# Patient Record
Sex: FEMALE | Race: ASIAN | HISPANIC OR LATINO | Employment: OTHER | URBAN - METROPOLITAN AREA
[De-identification: names, ages, dates, MRNs, and addresses within clinical notes are randomized per-mention and may not be internally consistent; named-entity substitution may affect disease eponyms.]

---

## 2017-12-21 DIAGNOSIS — R92.8 ABNORMAL MAMMOGRAM: Primary | ICD-10-CM

## 2018-01-02 ENCOUNTER — HOSPITAL ENCOUNTER (OUTPATIENT)
Dept: RADIOLOGY | Facility: HOSPITAL | Age: 48
Discharge: HOME | End: 2018-01-02
Attending: OBSTETRICS & GYNECOLOGY
Payer: COMMERCIAL

## 2018-01-02 DIAGNOSIS — R92.8 ABNORMAL MAMMOGRAM: ICD-10-CM

## 2018-01-02 PROCEDURE — 76642 ULTRASOUND BREAST LIMITED: CPT | Mod: TC,PO,RT

## 2018-01-02 PROCEDURE — 77065 DX MAMMO INCL CAD UNI: CPT | Mod: 26,,, | Performed by: RADIOLOGY

## 2018-01-02 PROCEDURE — 77065 DX MAMMO INCL CAD UNI: CPT | Mod: TC,PO

## 2018-01-02 PROCEDURE — 77061 BREAST TOMOSYNTHESIS UNI: CPT | Mod: TC,PO

## 2018-01-02 PROCEDURE — 76642 ULTRASOUND BREAST LIMITED: CPT | Mod: 26,RT,, | Performed by: RADIOLOGY

## 2018-01-02 PROCEDURE — 77061 BREAST TOMOSYNTHESIS UNI: CPT | Mod: 26,,, | Performed by: RADIOLOGY

## 2018-09-07 DIAGNOSIS — N13.30 HYDRONEPHROSIS: Primary | ICD-10-CM

## 2018-09-11 ENCOUNTER — HOSPITAL ENCOUNTER (OUTPATIENT)
Dept: RADIOLOGY | Facility: HOSPITAL | Age: 48
Discharge: HOME | End: 2018-09-11
Attending: FAMILY MEDICINE
Payer: COMMERCIAL

## 2018-09-11 DIAGNOSIS — N13.30 HYDRONEPHROSIS: ICD-10-CM

## 2018-09-11 PROCEDURE — 74176 CT ABD & PELVIS W/O CONTRAST: CPT | Mod: TC

## 2018-09-11 PROCEDURE — 25500020 PHARM REV CODE 255: Performed by: FAMILY MEDICINE

## 2018-09-11 PROCEDURE — 74176 CT ABD & PELVIS W/O CONTRAST: CPT | Mod: 26,,, | Performed by: RADIOLOGY

## 2018-09-11 RX ADMIN — IOHEXOL 30 ML: 350 INJECTION, SOLUTION INTRAVENOUS at 01:09

## 2018-10-23 ENCOUNTER — OFFICE VISIT (OUTPATIENT)
Dept: UROLOGY | Facility: CLINIC | Age: 48
End: 2018-10-23
Payer: COMMERCIAL

## 2018-10-23 VITALS
BODY MASS INDEX: 21.39 KG/M2 | HEIGHT: 61 IN | WEIGHT: 113.31 LBS | DIASTOLIC BLOOD PRESSURE: 74 MMHG | SYSTOLIC BLOOD PRESSURE: 101 MMHG | HEART RATE: 65 BPM

## 2018-10-23 DIAGNOSIS — R10.12 LUQ ABDOMINAL PAIN: Primary | ICD-10-CM

## 2018-10-23 PROCEDURE — 3008F BODY MASS INDEX DOCD: CPT | Mod: CPTII,S$GLB,, | Performed by: UROLOGY

## 2018-10-23 PROCEDURE — 99999 PR PBB SHADOW E&M-EST. PATIENT-LVL III: CPT | Mod: PBBFAC,,, | Performed by: UROLOGY

## 2018-10-23 PROCEDURE — 99204 OFFICE O/P NEW MOD 45 MIN: CPT | Mod: S$GLB,,, | Performed by: UROLOGY

## 2018-10-23 NOTE — PROGRESS NOTES
Subjective:       Patient ID: Carrington Mckinley is a 48 y.o. female.    Chief Complaint: hydronephrosis    HPI    Carrington Mckinley is a 48 y.o. female here for discussion and evaluation of hydronephrosis. Abdomen CT on 18 showed mild asymmetric fullness to the right collecting system and no obstructing stone identified. CT was ordered due to left-sided flank pain. Notes no pain at this time and it has been a long time since she experienced gross hematuria.     History reviewed. No pertinent past medical history.    Past Surgical History:   Procedure Laterality Date    BREAST CYST EXCISION Left     age 8     SECTION         Family History   Problem Relation Age of Onset    Breast cancer Sister        Social History     Socioeconomic History    Marital status:      Spouse name: Not on file    Number of children: Not on file    Years of education: Not on file    Highest education level: Not on file   Social Needs    Financial resource strain: Not on file    Food insecurity - worry: Not on file    Food insecurity - inability: Not on file    Transportation needs - medical: Not on file    Transportation needs - non-medical: Not on file   Occupational History    Not on file   Tobacco Use    Smoking status: Never Smoker   Substance and Sexual Activity    Alcohol use: No    Drug use: No    Sexual activity: Yes     Partners: Male   Other Topics Concern    Not on file   Social History Narrative    Not on file       Allergies:  Patient has no known allergies.    Medications:    Current Outpatient Medications:     iron bis-gly/FA/C/B12/Ca/succ (IRON-150 ORAL), Take by mouth., Disp: , Rfl:     Review of Systems   Constitutional: Negative for activity change, appetite change, chills, diaphoresis, fatigue, fever and unexpected weight change.   HENT: Negative for congestion, dental problem, hearing loss, mouth sores, postnasal drip, rhinorrhea, sinus pressure and trouble swallowing.    Eyes: Negative  for pain, discharge and itching.   Respiratory: Negative for apnea, cough, choking, chest tightness, shortness of breath and wheezing.    Cardiovascular: Negative for chest pain, palpitations and leg swelling.   Gastrointestinal: Negative for abdominal distention, abdominal pain, anal bleeding, blood in stool, constipation, diarrhea, nausea, rectal pain and vomiting.   Endocrine: Negative for polydipsia and polyuria.   Genitourinary: Negative for decreased urine volume, difficulty urinating, dyspareunia, dysuria, enuresis, flank pain, frequency, genital sores, hematuria, menstrual problem, pelvic pain and urgency.   Musculoskeletal: Negative for arthralgias, back pain and myalgias.   Skin: Negative for color change, rash and wound.   Neurological: Negative for dizziness, syncope, speech difficulty, light-headedness and headaches.   Hematological: Negative for adenopathy. Does not bruise/bleed easily.   Psychiatric/Behavioral: Negative for behavioral problems, confusion and sleep disturbance.       Objective:      Physical Exam   Constitutional: She appears well-developed.   HENT:   Head: Normocephalic.   Neck: Neck supple.   Cardiovascular: Normal rate.    Pulmonary/Chest: Effort normal.   Abdominal: Soft.   Neurological: She is alert.   Skin: Skin is warm.     Psychiatric: She has a normal mood and affect.       Imaging:    CT Abdomen 09/11/18  Mild asymmetric fullness to the right collecting system.  No obstructing stone identified.  Further evaluation could be performed with a CT urogram, if indicated.    Assessment:       1. LUQ abdominal pain        Plan:       Carrington was seen today for hydronephrosis.    Diagnoses and all orders for this visit:    LUQ abdominal pain          Reviewed with patient abdominal CT showing no hydronephrosis.  Recommended lasix renal scan but patient would like to continue monitoring it at this time.  RTC if symptoms worsen or if she decides for lasix renal scan.    Akin PRECIADO, am  acting as a scribe on this patient encounter in the presence and under the supervision of Dr. Gutierres.    10/23/2018 7:06 AM    I, Dr. Gutierres, personally performed the services described in this documentation.   All medical record entries made by the scribe were at my direction and in my presence.   I have reviewed the chart and agree that the record is accurate and complete.   Lorne Gutierres MD.  7:22 AM 10/23/2018     Patient will call me if she has any problems and I will order a Lasix renal scan.  I do not think that she has significant hydronephrosis in her discomfort is on the left sign not the right side.  Patient will call me p.r.n.

## 2019-01-23 DIAGNOSIS — Z12.39 SCREENING BREAST EXAMINATION: Primary | ICD-10-CM

## 2019-01-29 ENCOUNTER — HOSPITAL ENCOUNTER (OUTPATIENT)
Dept: RADIOLOGY | Facility: HOSPITAL | Age: 49
Discharge: HOME | End: 2019-01-29
Attending: FAMILY MEDICINE
Payer: COMMERCIAL

## 2019-01-29 DIAGNOSIS — Z12.39 SCREENING BREAST EXAMINATION: ICD-10-CM

## 2019-01-29 PROCEDURE — 77067 SCR MAMMO BI INCL CAD: CPT | Mod: TC

## 2019-01-29 PROCEDURE — 77063 BREAST TOMOSYNTHESIS BI: CPT | Mod: 26,,, | Performed by: RADIOLOGY

## 2019-01-29 PROCEDURE — 77063 MAMMO DIGITAL SCREENING BILAT WITH TOMOSYNTHESIS_CAD: ICD-10-PCS | Mod: 26,,, | Performed by: RADIOLOGY

## 2019-01-29 PROCEDURE — 77067 MAMMO DIGITAL SCREENING BILAT WITH TOMOSYNTHESIS_CAD: ICD-10-PCS | Mod: 26,,, | Performed by: RADIOLOGY

## 2019-01-29 PROCEDURE — 77067 SCR MAMMO BI INCL CAD: CPT | Mod: 26,,, | Performed by: RADIOLOGY

## 2020-01-20 ENCOUNTER — TELEPHONE (OUTPATIENT)
Dept: OBSTETRICS AND GYNECOLOGY | Facility: CLINIC | Age: 50
End: 2020-01-20

## 2020-01-20 NOTE — TELEPHONE ENCOUNTER
----- Message from Gabriela Weaver sent at 1/20/2020 10:41 AM CST -----  Contact: pt @[phone]  Pt called in to schedule mammogram but needs orders

## 2020-01-20 NOTE — TELEPHONE ENCOUNTER
Pt notified she must be seen for annual prior to having mammo order. Pt schedule annual with Dr. Wiedemann

## 2020-02-12 DIAGNOSIS — Z12.31 VISIT FOR SCREENING MAMMOGRAM: Primary | ICD-10-CM

## 2020-02-18 ENCOUNTER — HOSPITAL ENCOUNTER (OUTPATIENT)
Dept: RADIOLOGY | Facility: HOSPITAL | Age: 50
Discharge: HOME | End: 2020-02-18
Attending: OBSTETRICS & GYNECOLOGY
Payer: COMMERCIAL

## 2020-02-18 DIAGNOSIS — Z12.31 VISIT FOR SCREENING MAMMOGRAM: ICD-10-CM

## 2020-02-18 PROCEDURE — 77063 MAMMO DIGITAL SCREENING BILAT WITH TOMOSYNTHESIS_CAD: ICD-10-PCS | Mod: 26,,, | Performed by: RADIOLOGY

## 2020-02-18 PROCEDURE — 77063 BREAST TOMOSYNTHESIS BI: CPT | Mod: 26,,, | Performed by: RADIOLOGY

## 2020-02-18 PROCEDURE — 77067 SCR MAMMO BI INCL CAD: CPT | Mod: TC

## 2020-02-18 PROCEDURE — 77067 SCR MAMMO BI INCL CAD: CPT | Mod: 26,,, | Performed by: RADIOLOGY

## 2020-02-18 PROCEDURE — 77067 MAMMO DIGITAL SCREENING BILAT WITH TOMOSYNTHESIS_CAD: ICD-10-PCS | Mod: 26,,, | Performed by: RADIOLOGY

## 2020-03-03 ENCOUNTER — HOSPITAL ENCOUNTER (OUTPATIENT)
Dept: RADIOLOGY | Facility: HOSPITAL | Age: 50
Discharge: HOME | End: 2020-03-03
Attending: OBSTETRICS & GYNECOLOGY
Payer: COMMERCIAL

## 2020-03-03 DIAGNOSIS — R10.2 PELVIC PAIN IN FEMALE: ICD-10-CM

## 2020-03-03 PROCEDURE — 76856 US EXAM PELVIC COMPLETE: CPT | Mod: 26,,, | Performed by: RADIOLOGY

## 2020-03-03 PROCEDURE — 76830 US PELVIS COMP WITH TRANSVAG NON-OB (XPD): ICD-10-PCS | Mod: 26,,, | Performed by: RADIOLOGY

## 2020-03-03 PROCEDURE — 76830 TRANSVAGINAL US NON-OB: CPT | Mod: 26,,, | Performed by: RADIOLOGY

## 2020-03-03 PROCEDURE — 76856 US PELVIS COMP WITH TRANSVAG NON-OB (XPD): ICD-10-PCS | Mod: 26,,, | Performed by: RADIOLOGY

## 2020-03-03 PROCEDURE — 76830 TRANSVAGINAL US NON-OB: CPT | Mod: TC

## 2021-02-22 DIAGNOSIS — Z12.31 SCREENING MAMMOGRAM, ENCOUNTER FOR: Primary | ICD-10-CM

## 2021-03-01 ENCOUNTER — HOSPITAL ENCOUNTER (OUTPATIENT)
Dept: RADIOLOGY | Facility: HOSPITAL | Age: 51
Discharge: HOME | End: 2021-03-01
Attending: FAMILY MEDICINE
Payer: COMMERCIAL

## 2021-03-01 VITALS — WEIGHT: 122 LBS | BODY MASS INDEX: 24.6 KG/M2 | HEIGHT: 59 IN

## 2021-03-01 DIAGNOSIS — Z12.31 SCREENING MAMMOGRAM, ENCOUNTER FOR: ICD-10-CM

## 2021-03-01 PROCEDURE — 77067 MAMMO DIGITAL SCREENING BILAT WITH TOMO: ICD-10-PCS | Mod: 26,,, | Performed by: RADIOLOGY

## 2021-03-01 PROCEDURE — 77063 MAMMO DIGITAL SCREENING BILAT WITH TOMO: ICD-10-PCS | Mod: 26,,, | Performed by: RADIOLOGY

## 2021-03-01 PROCEDURE — 77063 BREAST TOMOSYNTHESIS BI: CPT | Mod: 26,,, | Performed by: RADIOLOGY

## 2021-03-01 PROCEDURE — 77067 SCR MAMMO BI INCL CAD: CPT | Mod: TC

## 2021-03-01 PROCEDURE — 77067 SCR MAMMO BI INCL CAD: CPT | Mod: 26,,, | Performed by: RADIOLOGY

## 2022-04-12 ENCOUNTER — HOSPITAL ENCOUNTER (OUTPATIENT)
Dept: RADIOLOGY | Facility: HOSPITAL | Age: 52
Discharge: HOME | End: 2022-04-12
Attending: FAMILY MEDICINE
Payer: COMMERCIAL

## 2022-04-12 DIAGNOSIS — Z12.31 ENCOUNTER FOR SCREENING MAMMOGRAM FOR MALIGNANT NEOPLASM OF BREAST: ICD-10-CM

## 2022-04-12 PROCEDURE — 77063 BREAST TOMOSYNTHESIS BI: CPT | Mod: TC

## 2022-04-12 PROCEDURE — 77067 MAMMO DIGITAL SCREENING BILAT WITH TOMO: ICD-10-PCS | Mod: 26,,, | Performed by: RADIOLOGY

## 2022-04-12 PROCEDURE — 77067 SCR MAMMO BI INCL CAD: CPT | Mod: TC

## 2022-04-12 PROCEDURE — 77063 MAMMO DIGITAL SCREENING BILAT WITH TOMO: ICD-10-PCS | Mod: 26,,, | Performed by: RADIOLOGY

## 2022-04-12 PROCEDURE — 77067 SCR MAMMO BI INCL CAD: CPT | Mod: 26,,, | Performed by: RADIOLOGY

## 2022-04-12 PROCEDURE — 77063 BREAST TOMOSYNTHESIS BI: CPT | Mod: 26,,, | Performed by: RADIOLOGY

## 2022-06-02 ENCOUNTER — TELEPHONE (OUTPATIENT)
Dept: GASTROENTEROLOGY | Facility: CLINIC | Age: 52
End: 2022-06-02
Payer: COMMERCIAL

## 2022-06-02 NOTE — TELEPHONE ENCOUNTER
Spoke to patient, I scheduled patient for an appointment to see Dr. Pinedo on 07/14/2022 @8:30am.  Patient stated that she is having heartburn, and trouble swallowing.

## 2022-06-02 NOTE — TELEPHONE ENCOUNTER
----- Message from Nitish Roblero sent at 6/2/2022  2:11 PM CDT -----  Type:  Patient Requesting Referral    Who Called New Pt   Does the patient already have the specialty appointment scheduled?  If yes, what is the date of that appointment?:  Referral to What Specialty:gi  Reason for Referral:heart burn / trouble swallowing   Does the patient want the referral with a specific physician?:open  Is the specialist an [facility]  Patient Requesting a Response?:yes   Would the patient rather a call back or a response via [facility]? Call   Best Call Back Number:[phone]  Additional Information:     Referred by Dr Maria A Soliz

## 2022-06-16 DIAGNOSIS — R10.9 STOMACH ACHE: Primary | ICD-10-CM

## 2022-06-21 ENCOUNTER — HOSPITAL ENCOUNTER (OUTPATIENT)
Dept: RADIOLOGY | Facility: HOSPITAL | Age: 52
Discharge: HOME | End: 2022-06-21
Attending: FAMILY MEDICINE
Payer: COMMERCIAL

## 2022-06-21 DIAGNOSIS — R10.9 STOMACH ACHE: ICD-10-CM

## 2022-06-21 PROCEDURE — 74150 CT ABDOMEN WITHOUT CONTRAST: ICD-10-PCS | Mod: 26,,, | Performed by: RADIOLOGY

## 2022-06-21 PROCEDURE — 74150 CT ABDOMEN W/O CONTRAST: CPT | Mod: TC

## 2022-06-21 PROCEDURE — 74150 CT ABDOMEN W/O CONTRAST: CPT | Mod: 26,,, | Performed by: RADIOLOGY

## 2022-07-14 ENCOUNTER — OFFICE VISIT (OUTPATIENT)
Dept: GASTROENTEROLOGY | Facility: CLINIC | Age: 52
End: 2022-07-14
Payer: COMMERCIAL

## 2022-07-14 VITALS — HEIGHT: 61 IN | BODY MASS INDEX: 22.02 KG/M2 | WEIGHT: 116.63 LBS

## 2022-07-14 DIAGNOSIS — K21.9 GASTROESOPHAGEAL REFLUX DISEASE, UNSPECIFIED WHETHER ESOPHAGITIS PRESENT: Primary | ICD-10-CM

## 2022-07-14 DIAGNOSIS — R13.10 DYSPHAGIA, UNSPECIFIED TYPE: ICD-10-CM

## 2022-07-14 DIAGNOSIS — Z12.11 COLON CANCER SCREENING: ICD-10-CM

## 2022-07-14 PROCEDURE — 1159F MED LIST DOCD IN RCRD: CPT | Mod: CPTII,S$GLB,, | Performed by: INTERNAL MEDICINE

## 2022-07-14 PROCEDURE — 99204 OFFICE O/P NEW MOD 45 MIN: CPT | Mod: S$GLB,,, | Performed by: INTERNAL MEDICINE

## 2022-07-14 PROCEDURE — 99999 PR PBB SHADOW E&M-EST. PATIENT-LVL III: ICD-10-PCS | Mod: PBBFAC,,, | Performed by: INTERNAL MEDICINE

## 2022-07-14 PROCEDURE — 99999 PR PBB SHADOW E&M-EST. PATIENT-LVL III: CPT | Mod: PBBFAC,,, | Performed by: INTERNAL MEDICINE

## 2022-07-14 PROCEDURE — 3008F BODY MASS INDEX DOCD: CPT | Mod: CPTII,S$GLB,, | Performed by: INTERNAL MEDICINE

## 2022-07-14 PROCEDURE — 3008F PR BODY MASS INDEX (BMI) DOCUMENTED: ICD-10-PCS | Mod: CPTII,S$GLB,, | Performed by: INTERNAL MEDICINE

## 2022-07-14 PROCEDURE — 1159F PR MEDICATION LIST DOCUMENTED IN MEDICAL RECORD: ICD-10-PCS | Mod: CPTII,S$GLB,, | Performed by: INTERNAL MEDICINE

## 2022-07-14 PROCEDURE — 99204 PR OFFICE/OUTPT VISIT, NEW, LEVL IV, 45-59 MIN: ICD-10-PCS | Mod: S$GLB,,, | Performed by: INTERNAL MEDICINE

## 2022-07-14 RX ORDER — SUCRALFATE 1 G/1
1 TABLET ORAL 2 TIMES DAILY
COMMUNITY
Start: 2022-06-13 | End: 2023-02-07 | Stop reason: ALTCHOICE

## 2022-07-14 RX ORDER — ATORVASTATIN CALCIUM 10 MG/1
10 TABLET, FILM COATED ORAL NIGHTLY
COMMUNITY
Start: 2022-06-13

## 2022-07-14 RX ORDER — SOD SULF/POT CHLORIDE/MAG SULF 1.479 G
12 TABLET ORAL DAILY
Qty: 24 TABLET | Refills: 0 | Status: ON HOLD | OUTPATIENT
Start: 2022-07-14 | End: 2022-09-13 | Stop reason: CLARIF

## 2022-07-14 RX ORDER — PANTOPRAZOLE SODIUM 40 MG/1
40 TABLET, DELAYED RELEASE ORAL DAILY
COMMUNITY
Start: 2022-06-13 | End: 2022-12-08 | Stop reason: SDUPTHER

## 2022-07-14 NOTE — PROGRESS NOTES
Subjective:       Patient ID: Carrington Mckinley is a 51 y.o. female.    Chief Complaint: Heartburn and Gastroesophageal Reflux    Patient here today to establish care with aforementioned complaints.  Patient reports history of upset stomach which has been going on for the past 4-5 months.  She characterizes complaints as I pain when hungry I as well as a fullness in chest after eating.  She was prescribed Protonix which she is taking as instructed.  She does not feel like it is helping much.  She denies weight loss.  Denies family history of stomach or colon cancer.  She does report her father had pancreatic cancer.  She denies prior endoscopy.      CT abdomen pelvis without contrast 2026  FINDINGS:  The liver, gallbladder, pancreas, spleen, and adrenal glands are grossly unremarkable, noting limited assessment without IV contrast.  No renal stones or hydronephrosis.  The abdominal aorta tapers normally.  There is minimal scattered calcific atherosclerosis.  No periaortic lymphadenopathy.     There is prominent stool at the terminal ileum suggesting incompetent ileocecal valve.  Scattered colonic diverticulum.  No pericolonic fat stranding/inflammatory change.  No dilated loops of bowel.  No mesenteric lymphadenopathy.     Ventral wall: Unremarkable.     Bones: No marrow replacement process.     Lung bases: Clear.     Impression:     No acute findings.      History reviewed. No pertinent past medical history.    Past Surgical History:   Procedure Laterality Date    BREAST CYST EXCISION Left     age 8     SECTION         Social History  Social History     Tobacco Use    Smoking status: Never Smoker   Substance Use Topics    Alcohol use: No    Drug use: No       Family History   Problem Relation Age of Onset    Breast cancer Sister        Review of Systems   HENT: Positive for trouble swallowing.    Cardiovascular: Positive for chest pain.   Gastrointestinal: Positive for abdominal distention and  abdominal pain. Negative for blood in stool.           Objective:      Physical Exam  Constitutional:       Appearance: She is well-developed.   HENT:      Head: Normocephalic and atraumatic.   Eyes:      Conjunctiva/sclera: Conjunctivae normal.   Pulmonary:      Effort: Pulmonary effort is normal. No respiratory distress.   Musculoskeletal:      Cervical back: Normal range of motion.   Neurological:      Mental Status: She is alert and oriented to person, place, and time.   Psychiatric:         Behavior: Behavior normal.         Thought Content: Thought content normal.         Judgment: Judgment normal.           Pertinent labs and imaging studies reviewed    Assessment:       1. Gastroesophageal reflux disease, unspecified whether esophagitis present    2. Colon cancer screening    3. Dysphagia, unspecified type        Plan:       Schedule EGD/colonoscopy    (Portions of this note were dictated using voice recognition software and may contain dictation related errors in spelling/grammar/syntax not found on text review)

## 2022-07-14 NOTE — PATIENT INSTRUCTIONS
SUTAB Instructions    [facility] [address]  Clinic Office [phone]  Endoscopy Lab [phone]    You are scheduled for a Colonoscopy with   Dr. ADAM BARRY  on   09/13/2022  at [facility] in [place].    Check in at the Hospital -1st floor, Information desk. A covid test will be required 3 days before your procedure.  Call [phone] to reschedule.    An adult friend/family member must come with you to drive you home.  You cannot drive, take a taxi, Uber/Lyft or bus to leave the Endoscopy Center alone.  If you do not have someone to drive you home, your test will be cancelled.     Please follow the directions of your doctor if you take any pills that thin your blood. If you take these meds: Aggrenox, Brilinta, Effient, Eliquis, Lovenox, Plavix, Pletal, Pradaxa, Ticilid, Xarelto or Coumadin, let the doctor's office know.    DON'T: On the morning of the test do not take insulin or pills for diabetes.     DO: On the morning of the test, do take any pills for blood pressure, heart, anti-rejection and or seizures with a small sip of water. Bring any inhalers with you.    To have a good prep, you must follow these instructions - please do not use the directions from the pharmacy.    The doctor will send a prescription for the SUTAB.    The Day Before the test:    You can only drink CLEAR LIQUIDS the whole day before your test.  You can't eat any food for the whole day.    You CAN have:  Water, Coffee or decaf coffee (no milk or cream)  Tea  Soft drinks - regular and sugar free  Jell-O (green or yellow)  Apple Juice, grape juice, white cranberry juice  Gatorade, Power Aid, Crystal Light, Shai Aid  Lemonade and Limeade  Bouillon, clear soup  Snowball, popsicles  YOU CAN'T DRINK ANYTHING RED, PURPLE ORANGE OR BLUE   YOU CAN'T DRINK ALCOHOL  ONLY DRINK WHAT IS ON THE LIST      At 5 pm the night before your test:    Open the bottle of 12 tablets. Fill the provided cup with  water up to the line = 16 oz. Swallow each pill with water. Drink the rest of the water in the cup in 20 minutes.    At 6 pm:  Fill the cup with water up to the line = 16 oz. Drink the cup of water in 30 minutes.    At 7 pm:  Fill the cup with water up to the line = 16 oz. Drink the cup of water in 30 minutes.     Continue to drink water or clear liquids until you go to sleep.      The Day of the test - We will call you 2 days before your test to tell you what time to get to the hospital. We will also tell you when to do the next steps.    5 hours before you come to the hospital (this may be in the middle of the night): ___________________= time  Open the bottle of 12 tablets. Fill the cup with water up to the line = 16 oz. Swallow each pill with water. Drink the rest of the water in the cup in 20 minutes.    At 4 hours before you come to the hospital: ______________= time  Fill the cup with water up to the line = 16 oz. Drink the cup of water in 30 minutes.     At 3 hours before you come to the hospital: ______________= time    YOU CAN'T EAT OR DRINK ANYTHING ELSE ONCE YOU FINISH THE WATER AT _____________ = TIME    Leave all valuables and jewelry at home. You will be at the hospital for 2-4 hours.    Call the Endoscopy department at [phone] with any questions about your procedure.          Please give this Coupon Code to your pharmacist.    BIN: [id number]  DANIALN: [id number]  GROUP: [id number]  MEMBER ID: [id number]

## 2022-09-06 ENCOUNTER — TELEPHONE (OUTPATIENT)
Dept: GASTROENTEROLOGY | Facility: CLINIC | Age: 52
End: 2022-09-06
Payer: COMMERCIAL

## 2022-09-06 NOTE — TELEPHONE ENCOUNTER
Returned pt's call. Pt wanting to know what time procedure is scheduled. Advised hospital will call with arrival time 2-3 days prior to procedure to let her know. Pt verbalized understanding and thanked me for the call.

## 2022-09-08 ENCOUNTER — TELEPHONE (OUTPATIENT)
Dept: ENDOSCOPY | Facility: HOSPITAL | Age: 52
End: 2022-09-08
Payer: COMMERCIAL

## 2022-09-08 NOTE — TELEPHONE ENCOUNTER
Spoke with patient about arrival time @ 1210.   Covid test =  Vaccinated    Prep instructions reviewed: the day before the procedure, follow a clear liquid diet all day, then start the first 1/2 of prep at 5pm and take 2nd 1/2 of prep @.  Pt must be completely NPO when prep completed @.              Medications: Do not take Insulin or oral diabetic medications the day of the procedure.  Take as prescribed: heart, seizure and blood pressure medication in the morning with a sip of water (less than an ounce).  Take any breathing medications and bring inhalers to hospital with you Leave all valuables and jewelry at home.     Wear comfortable clothes to procedure to change into hospital gown You cannot drive for 24 hours after your procedure because you will receive sedation for your procedure to make you comfortable.  A ride must be provided at discharge.

## 2022-09-13 ENCOUNTER — NURSE TRIAGE (OUTPATIENT)
Dept: ADMINISTRATIVE | Facility: CLINIC | Age: 52
End: 2022-09-13
Payer: COMMERCIAL

## 2022-09-13 ENCOUNTER — HOSPITAL ENCOUNTER (OUTPATIENT)
Facility: HOSPITAL | Age: 52
Discharge: HOME | End: 2022-09-13
Attending: INTERNAL MEDICINE | Admitting: INTERNAL MEDICINE
Payer: COMMERCIAL

## 2022-09-13 ENCOUNTER — ANESTHESIA (OUTPATIENT)
Dept: ENDOSCOPY | Facility: HOSPITAL | Age: 52
End: 2022-09-13
Payer: COMMERCIAL

## 2022-09-13 ENCOUNTER — ANESTHESIA EVENT (OUTPATIENT)
Dept: ENDOSCOPY | Facility: HOSPITAL | Age: 52
End: 2022-09-13
Payer: COMMERCIAL

## 2022-09-13 VITALS
WEIGHT: 120 LBS | TEMPERATURE: 99 F | DIASTOLIC BLOOD PRESSURE: 55 MMHG | SYSTOLIC BLOOD PRESSURE: 109 MMHG | HEART RATE: 75 BPM | HEIGHT: 61 IN | BODY MASS INDEX: 22.66 KG/M2 | OXYGEN SATURATION: 100 % | RESPIRATION RATE: 17 BRPM

## 2022-09-13 DIAGNOSIS — K21.9 GERD (GASTROESOPHAGEAL REFLUX DISEASE): ICD-10-CM

## 2022-09-13 LAB
B-HCG UR QL: NEGATIVE
CTP QC/QA: YES

## 2022-09-13 PROCEDURE — 27201012 HC FORCEPS, HOT/COLD, DISP: Performed by: INTERNAL MEDICINE

## 2022-09-13 PROCEDURE — 45380 PR COLONOSCOPY,BIOPSY: ICD-10-PCS | Mod: 33,59,, | Performed by: INTERNAL MEDICINE

## 2022-09-13 PROCEDURE — 88305 TISSUE EXAM BY PATHOLOGIST: CPT | Mod: 26,,, | Performed by: PATHOLOGY

## 2022-09-13 PROCEDURE — 45385 PR COLONOSCOPY,REMV LESN,SNARE: ICD-10-PCS | Mod: 33,,, | Performed by: INTERNAL MEDICINE

## 2022-09-13 PROCEDURE — 25000003 PHARM REV CODE 250: Performed by: INTERNAL MEDICINE

## 2022-09-13 PROCEDURE — 27201089 HC SNARE, DISP (ANY): Performed by: INTERNAL MEDICINE

## 2022-09-13 PROCEDURE — 37000009 HC ANESTHESIA EA ADD 15 MINS: Performed by: INTERNAL MEDICINE

## 2022-09-13 PROCEDURE — 25000003 PHARM REV CODE 250: Performed by: NURSE ANESTHETIST, CERTIFIED REGISTERED

## 2022-09-13 PROCEDURE — 81025 URINE PREGNANCY TEST: CPT | Performed by: INTERNAL MEDICINE

## 2022-09-13 PROCEDURE — 45385 COLONOSCOPY W/LESION REMOVAL: CPT | Mod: 33,,, | Performed by: INTERNAL MEDICINE

## 2022-09-13 PROCEDURE — 37000008 HC ANESTHESIA 1ST 15 MINUTES: Performed by: INTERNAL MEDICINE

## 2022-09-13 PROCEDURE — 88305 TISSUE EXAM BY PATHOLOGIST: ICD-10-PCS | Mod: 26,,, | Performed by: PATHOLOGY

## 2022-09-13 PROCEDURE — 43239 EGD BIOPSY SINGLE/MULTIPLE: CPT | Mod: 51,,, | Performed by: INTERNAL MEDICINE

## 2022-09-13 PROCEDURE — 45385 COLONOSCOPY W/LESION REMOVAL: CPT | Mod: PT | Performed by: INTERNAL MEDICINE

## 2022-09-13 PROCEDURE — 63600175 PHARM REV CODE 636 W HCPCS: Performed by: NURSE ANESTHETIST, CERTIFIED REGISTERED

## 2022-09-13 PROCEDURE — 45380 COLONOSCOPY AND BIOPSY: CPT | Mod: 33,59,, | Performed by: INTERNAL MEDICINE

## 2022-09-13 PROCEDURE — 43239 EGD BIOPSY SINGLE/MULTIPLE: CPT | Performed by: INTERNAL MEDICINE

## 2022-09-13 PROCEDURE — 43239 PR EGD, FLEX, W/BIOPSY, SGL/MULTI: ICD-10-PCS | Mod: 51,,, | Performed by: INTERNAL MEDICINE

## 2022-09-13 PROCEDURE — 45380 COLONOSCOPY AND BIOPSY: CPT | Mod: PT,59 | Performed by: INTERNAL MEDICINE

## 2022-09-13 PROCEDURE — 88305 TISSUE EXAM BY PATHOLOGIST: CPT | Performed by: PATHOLOGY

## 2022-09-13 RX ORDER — LIDOCAINE HCL/PF 100 MG/5ML
SYRINGE (ML) INTRAVENOUS
Status: DISCONTINUED | OUTPATIENT
Start: 2022-09-13 | End: 2022-09-13

## 2022-09-13 RX ORDER — SODIUM CHLORIDE 9 MG/ML
INJECTION, SOLUTION INTRAVENOUS CONTINUOUS
Status: DISCONTINUED | OUTPATIENT
Start: 2022-09-13 | End: 2022-09-13 | Stop reason: HOSPADM

## 2022-09-13 RX ORDER — SODIUM CHLORIDE 0.9 % (FLUSH) 0.9 %
3 SYRINGE (ML) INJECTION
Status: DISCONTINUED | OUTPATIENT
Start: 2022-09-13 | End: 2022-09-13 | Stop reason: HOSPADM

## 2022-09-13 RX ORDER — PROPOFOL 10 MG/ML
VIAL (ML) INTRAVENOUS
Status: DISCONTINUED | OUTPATIENT
Start: 2022-09-13 | End: 2022-09-13

## 2022-09-13 RX ORDER — PROPOFOL 10 MG/ML
VIAL (ML) INTRAVENOUS CONTINUOUS PRN
Status: DISCONTINUED | OUTPATIENT
Start: 2022-09-13 | End: 2022-09-13

## 2022-09-13 RX ORDER — DEXTROMETHORPHAN/PSEUDOEPHED 2.5-7.5/.8
DROPS ORAL
Status: DISCONTINUED | OUTPATIENT
Start: 2022-09-13 | End: 2022-09-13 | Stop reason: HOSPADM

## 2022-09-13 RX ADMIN — PROPOFOL 60 MG: 10 INJECTION, EMULSION INTRAVENOUS at 12:09

## 2022-09-13 RX ADMIN — LIDOCAINE HYDROCHLORIDE 50 MG: 20 INJECTION, SOLUTION INTRAVENOUS at 12:09

## 2022-09-13 RX ADMIN — SODIUM CHLORIDE: 0.9 INJECTION, SOLUTION INTRAVENOUS at 12:09

## 2022-09-13 RX ADMIN — PROPOFOL 150 MCG/KG/MIN: 10 INJECTION, EMULSION INTRAVENOUS at 12:09

## 2022-09-13 NOTE — PLAN OF CARE
Pt procedure completed with no complications.Pt AAO x 4.V/S are stable.Discharge instructions given and explained. Pt verbalizes understanding.IV discontinued as ordered. spoke to pt about results and follow up care.

## 2022-09-13 NOTE — PROVATION PATIENT INSTRUCTIONS
Discharge Summary/Instructions after an Endoscopic Procedure  Patient Name: Carrington Mckinley  Patient MRN: [id number]  Patient YOB: 1970 Tuesday, September 13, 2022  Shilo Pinedo MD  Dear patient,  As a result of recent federal legislation (The Federal Cures Act), you may   receive lab or pathology results from your procedure in your [facility]   account before your physician is able to contact you. Your physician or   their representative will relay the results to you with their   recommendations at their soonest availability.  Thank you,  Your health is very important to us during the Covid Crisis. Following your   procedure today, you will receive a daily text for 2 weeks asking about   signs or symptoms of Covid 19.  Please respond to this text when you   receive it so we can follow up and keep you as safe as possible.   RESTRICTIONS:  During your procedure today, you received medications for sedation.  These   medications may affect your judgment, balance and coordination.  Therefore,   for 24 hours, you have the following restrictions:   - DO NOT drive a car, operate machinery, make legal/financial decisions,   sign important papers or drink alcohol.    ACTIVITY:  Today: no heavy lifting, straining or running due to procedural   sedation/anesthesia.  The following day: return to full activity including work.  DIET:  Eat and drink normally unless instructed otherwise.     TREATMENT FOR COMMON SIDE EFFECTS:  - Mild abdominal pain, nausea, belching, bloating or excessive gas:  rest,   eat lightly and use a heating pad.  - Sore Throat: treat with throat lozenges and/or gargle with warm salt   water.  - Because air was used during the procedure, expelling large amounts of air   from your rectum or belching is normal.  - If a bowel prep was taken, you may not have a bowel movement for 1-3 days.    This is normal.  SYMPTOMS TO WATCH FOR AND REPORT TO YOUR PHYSICIAN:  1. Abdominal pain or bloating,  other than gas cramps.  2. Chest pain.  3. Back pain.  4. Signs of infection such as: chills or fever occurring within 24 hours   after the procedure.  5. Rectal bleeding, which would show as bright red, maroon, or black stools.   (A tablespoon of blood from the rectum is not serious, especially if   hemorrhoids are present.)  6. Vomiting.  7. Weakness or dizziness.  GO DIRECTLY TO THE NEAREST EMERGENCY ROOM IF YOU HAVE ANY OF THE FOLLOWING:      Difficulty breathing              Chills and/or fever over 101 F   Persistent vomiting and/or vomiting blood   Severe abdominal pain   Severe chest pain   Black, tarry stools   Bleeding- more than one tablespoon   Any other symptom or condition that you feel may need urgent attention  Your doctor recommends these additional instructions:  If any biopsies were taken, your doctors clinic will contact you in 1 to 2   weeks with any results.  - Discharge patient to home.   - Resume previous diet.   - Continue present medications.   - Await pathology results.   - Perform a colonoscopy as previously scheduled.  For questions, problems or results please call your physician - Shilo Pinedo MD.  EMERGENCY PHONE NUMBER: [phone],  LAB RESULTS: [phone]  IF A COMPLICATION OR EMERGENCY SITUATION ARISES AND YOU ARE UNABLE TO REACH   YOUR PHYSICIAN - GO DIRECTLY TO THE EMERGENCY ROOM.  Shilo Pinedo MD  9/13/2022 1:04:19 PM  This report has been verified and signed electronically.  Dear patient,  As a result of recent federal legislation (The Federal Cures Act), you may   receive lab or pathology results from your procedure in your [facility]   account before your physician is able to contact you. Your physician or   their representative will relay the results to you with their   recommendations at their soonest availability.  Thank you,  PROVATION

## 2022-09-13 NOTE — H&P
Short Stay Endoscopy History and Physical    PCP - Primary Doctor No    Procedure - EGD/colonoscopy  ASA - III  Mallampati - per anesthesia  History of Anesthesia problems - no  Family history Anesthesia problems - no     HPI:  This is a 52 y.o. female here for evaluation of : Dysphagia. CRC screening    ROS:  Constitutional: No fevers, chills, No weight loss  ENT: No allergies  CV: No chest pain  Pulm: No shortness of breath  GI: see HPI  Derm: No rash    Medical History:  has no past medical history on file.    Surgical History:  has a past surgical history that includes  section and Breast cyst excision (Left).    Family History: family history includes Breast cancer in her sister.. Otherwise no colon cancer, inflammatory bowel disease, or GI malignancies.    Social History:  reports that she has never smoked. She does not have any smokeless tobacco history on file. She reports that she does not drink alcohol and does not use drugs.    Review of patient's allergies indicates:   Allergen Reactions    Delsym [dextromethorphan hbr] Hallucinations       Medications:   Medications Prior to Admission   Medication Sig Dispense Refill Last Dose    atorvastatin (LIPITOR) 10 MG tablet Take 10 mg by mouth nightly.       iron bis-gly/FA/C/B12/Ca/succ (IRON-150 ORAL) Take by mouth.       pantoprazole (PROTONIX) 40 MG tablet Take 40 mg by mouth once daily.       sucralfate (CARAFATE) 1 gram tablet Take 1 g by mouth 2 (two) times daily.            Objective Findings:    Vital Signs: see nursing notes  Physical Exam:  General Appearance: Well appearing in no acute distress  Eyes:    No scleral icterus  ENT: Neck supple  Lungs: CTA anteriorly  Heart:  S1, S2 normal, no murmurs heard  Abdomen: Soft, non tender, non distended with positive bowel sounds. No hepatosplenomegaly, ascites, or mass  Extremities: no edema  Skin: No rash      Labs:  Lab Results   Component Value Date    WBC 9.70 2012    HGB 10.3 (L)  02/07/2012    HCT 30.1 (L) 02/07/2012     02/07/2012    TSH 0.075 (L) 08/22/2011       I have explained the risks and benefits of endoscopy procedures to the patient including but not limited to bleeding, perforation, infection, and death.    Shilo Pinedo MD

## 2022-09-13 NOTE — ANESTHESIA POSTPROCEDURE EVALUATION
Anesthesia Post Evaluation    Patient: Carrington Mckinley    Procedure(s) Performed: Procedure(s) (LRB):  EGD (ESOPHAGOGASTRODUODENOSCOPY) (N/A)  COLONOSCOPY (N/A)    Final Anesthesia Type: MAC      Patient location during evaluation: GI PACU  Patient participation: Yes- Able to Participate  Level of consciousness: awake and alert  Post-procedure vital signs: reviewed and stable  Pain management: adequate  Airway patency: patent    PONV status at discharge: No PONV  Anesthetic complications: no      Cardiovascular status: hemodynamically stable  Respiratory status: unassisted, spontaneous ventilation and room air  Hydration status: euvolemic  Follow-up not needed.          Vitals Value Taken Time   /69 09/13/22 1222   Temp 36.6 °C (97.9 °F) 09/13/22 1222   Pulse 69 09/13/22 1222   Resp 18 09/13/22 1222   SpO2 99 % 09/13/22 1222         No case tracking events are documented in the log.      Pain/Nadeem Score: No data recorded

## 2022-09-13 NOTE — TRANSFER OF CARE
"Anesthesia Transfer of Care Note    Patient: Carrington Mckinley    Procedure(s) Performed: Procedure(s) (LRB):  EGD (ESOPHAGOGASTRODUODENOSCOPY) (N/A)  COLONOSCOPY (N/A)    Patient location: GI    Anesthesia Type: MAC    Transport from OR: Transported from OR on room air with adequate spontaneous ventilation    Post pain: adequate analgesia    Post assessment: no apparent anesthetic complications and tolerated procedure well    Post vital signs: stable    Level of consciousness: responds to stimulation and sedated    Nausea/Vomiting: no nausea/vomiting    Complications: none    Transfer of care protocol was followed      Last vitals:   Visit Vitals  BP (!) 151/69 (BP Location: Left arm, Patient Position: Lying)   Pulse 69   Temp 36.6 °C (97.9 °F) (Temporal)   Resp 18   Ht 5' 1" (1.549 m)   Wt 54.4 kg (120 lb)   SpO2 99%   Breastfeeding No   BMI 22.67 kg/m²     "

## 2022-09-13 NOTE — PROVATION PATIENT INSTRUCTIONS
Discharge Summary/Instructions after an Endoscopic Procedure  Patient Name: Carrington Mckinley  Patient MRN: [id number]  Patient YOB: 1970 Tuesday, September 13, 2022  Shilo Pinedo MD  Dear patient,  As a result of recent federal legislation (The Federal Cures Act), you may   receive lab or pathology results from your procedure in your [facility]   account before your physician is able to contact you. Your physician or   their representative will relay the results to you with their   recommendations at their soonest availability.  Thank you,  Your health is very important to us during the Covid Crisis. Following your   procedure today, you will receive a daily text for 2 weeks asking about   signs or symptoms of Covid 19.  Please respond to this text when you   receive it so we can follow up and keep you as safe as possible.   RESTRICTIONS:  During your procedure today, you received medications for sedation.  These   medications may affect your judgment, balance and coordination.  Therefore,   for 24 hours, you have the following restrictions:   - DO NOT drive a car, operate machinery, make legal/financial decisions,   sign important papers or drink alcohol.    ACTIVITY:  Today: no heavy lifting, straining or running due to procedural   sedation/anesthesia.  The following day: return to full activity including work.  DIET:  Eat and drink normally unless instructed otherwise.     TREATMENT FOR COMMON SIDE EFFECTS:  - Mild abdominal pain, nausea, belching, bloating or excessive gas:  rest,   eat lightly and use a heating pad.  - Sore Throat: treat with throat lozenges and/or gargle with warm salt   water.  - Because air was used during the procedure, expelling large amounts of air   from your rectum or belching is normal.  - If a bowel prep was taken, you may not have a bowel movement for 1-3 days.    This is normal.  SYMPTOMS TO WATCH FOR AND REPORT TO YOUR PHYSICIAN:  1. Abdominal pain or bloating,  other than gas cramps.  2. Chest pain.  3. Back pain.  4. Signs of infection such as: chills or fever occurring within 24 hours   after the procedure.  5. Rectal bleeding, which would show as bright red, maroon, or black stools.   (A tablespoon of blood from the rectum is not serious, especially if   hemorrhoids are present.)  6. Vomiting.  7. Weakness or dizziness.  GO DIRECTLY TO THE NEAREST EMERGENCY ROOM IF YOU HAVE ANY OF THE FOLLOWING:      Difficulty breathing              Chills and/or fever over 101 F   Persistent vomiting and/or vomiting blood   Severe abdominal pain   Severe chest pain   Black, tarry stools   Bleeding- more than one tablespoon   Any other symptom or condition that you feel may need urgent attention  Your doctor recommends these additional instructions:  If any biopsies were taken, your doctors clinic will contact you in 1 to 2   weeks with any results.  - Discharge patient to home.   - Resume previous diet.   - Continue present medications.   - Await pathology results.   - Repeat colonoscopy in 3 years for surveillance.   - Patient has a contact number available for emergencies.  The signs and   symptoms of potential delayed complications were discussed with the   patient.  Return to normal activities tomorrow.  Written discharge   instructions were provided to the patient.  For questions, problems or results please call your physician - Shilo Pinedo MD.  EMERGENCY PHONE NUMBER: [phone],  LAB RESULTS: [phone]  IF A COMPLICATION OR EMERGENCY SITUATION ARISES AND YOU ARE UNABLE TO REACH   YOUR PHYSICIAN - GO DIRECTLY TO THE EMERGENCY ROOM.  Shilo Pinedo MD  9/13/2022 1:32:11 PM  This report has been verified and signed electronically.  Dear patient,  As a result of recent federal legislation (The Federal Cures Act), you may   receive lab or pathology results from your procedure in your [facility]   account before your physician is able to contact you.  Your physician or   their representative will relay the results to you with their   recommendations at their soonest availability.  Thank you,  PROVATION

## 2022-09-13 NOTE — ANESTHESIA PREPROCEDURE EVALUATION
09/13/2022  Carrington Mckinley is a 52 y.o., female w hx of HLD and GERD for colonoscopy and EGD.      Pre-op Assessment    I have reviewed the Patient Summary Reports.     I have reviewed the Nursing Notes. I have reviewed the NPO Status.      Review of Systems  Hematology/Oncology:         -- Anemia:   Cardiovascular:   Denies MI.    Denies Angina. hyperlipidemia  Denies PIERRE.    Pulmonary:   Denies COPD.    Renal/:   Denies Chronic Renal Disease.     Hepatic/GI:   GERD    Neurological:  Neurology Normal    Psych:  Psychiatric Normal           Physical Exam  General: Cooperative, Alert and Oriented    Airway:  Mallampati: II   Mouth Opening: Normal  TM Distance: Normal  Tongue: Normal  Neck ROM: Normal ROM    Dental:  Intact    Chest/Lungs:  Clear to auscultation, Normal Respiratory Rate    Heart:  Rate: Normal  Rhythm: Regular Rhythm        Anesthesia Plan  Type of Anesthesia, risks & benefits discussed:    Anesthesia Type: MAC, Gen Natural Airway  Intra-op Monitoring Plan: Standard ASA Monitors  Post Op Pain Control Plan: multimodal analgesia  Induction:  IV  Informed Consent: Informed consent signed with the Patient and all parties understand the risks and agree with anesthesia plan.  All questions answered.   ASA Score: 2    Ready For Surgery From Anesthesia Perspective.     .    Lab Results   Component Value Date    WBC 9.70 02/07/2012    HGB 10.3 (L) 02/07/2012    HCT 30.1 (L) 02/07/2012     02/07/2012    TSH 0.075 (L) 08/22/2011

## 2022-09-13 NOTE — TELEPHONE ENCOUNTER
Patient had one episode of bleeding when she had BM, blood in toilet, states filled.. She did say she had a polyp removed. Just checked underwear, no blood noted now.. Advised I would reach out to surgeon but she may have to go to the ED if she has another episode. Verb understanding. Reason for Disposition   [1] MODERATE rectal bleeding (small blood clots, passing blood without stool, or toilet water turns red) AND [2] more than once    Additional Information   Negative: Shock suspected (e.g., cold/pale/clammy skin, too weak to stand, low BP, rapid pulse)   Negative: Difficult to awaken or acting confused (e.g., disoriented, slurred speech)   Negative: Passed out (i.e., lost consciousness, collapsed and was not responding)   Negative: Sounds like a life-threatening emergency to the triager   Negative: SEVERE abdomen pain (e.g., excruciating)   Negative: SEVERE rectal bleeding (large blood clots; on and off, or constant bleeding)   Negative: SEVERE dizziness (e.g., unable to stand, requires support to walk, feels like passing out now)   Negative: SEVERE vomiting (e.g., 6 or more times/day)    Protocols used: Colonoscopy Symptoms and [id number]

## 2022-09-14 ENCOUNTER — TELEPHONE (OUTPATIENT)
Dept: GASTROENTEROLOGY | Facility: CLINIC | Age: 52
End: 2022-09-14
Payer: COMMERCIAL

## 2022-09-14 NOTE — TELEPHONE ENCOUNTER
Follow up call made to pt to check on her. She says she went to the ER but left before being seen because she started feeling better. She denies having more bloody stools. I advised her to go back to the ER if the bleeding starts again; even if it's a small amount. Pt verbalized understanding and thanked me for the call.

## 2022-09-20 LAB
FINAL PATHOLOGIC DIAGNOSIS: NORMAL
GROSS: NORMAL
Lab: NORMAL

## 2022-09-21 ENCOUNTER — TELEPHONE (OUTPATIENT)
Dept: GASTROENTEROLOGY | Facility: CLINIC | Age: 52
End: 2022-09-21
Payer: COMMERCIAL

## 2022-09-21 NOTE — TELEPHONE ENCOUNTER
----- Message from Keara Pimentel sent at 9/21/2022  8:19 AM CDT -----  Type:  Needs Medical Advice    Who Called: self  Reason:speak with nurse but I couldn't understand why  Would the patient rather a call back or a response via [facility]? call  Best Call Back Number: [phone]  Additional Information: none

## 2022-09-21 NOTE — TELEPHONE ENCOUNTER
Patient wanted to know about test results. Patient was told that her results aren't in just yet but she will get a call when they are in. Verbal understanding.

## 2022-09-22 RX ORDER — CLARITHROMYCIN 500 MG/1
500 TABLET, FILM COATED ORAL EVERY 12 HOURS
Qty: 28 TABLET | Refills: 0 | Status: SHIPPED | OUTPATIENT
Start: 2022-09-22 | End: 2022-10-06

## 2022-09-22 RX ORDER — PANTOPRAZOLE SODIUM 40 MG/1
40 TABLET, DELAYED RELEASE ORAL 2 TIMES DAILY
Qty: 28 TABLET | Refills: 0 | Status: SHIPPED | OUTPATIENT
Start: 2022-09-22 | End: 2022-10-06

## 2022-09-22 RX ORDER — AMOXICILLIN 500 MG/1
1000 TABLET, FILM COATED ORAL EVERY 12 HOURS
Qty: 56 TABLET | Refills: 0 | Status: SHIPPED | OUTPATIENT
Start: 2022-09-22 | End: 2022-10-06

## 2022-09-22 NOTE — PROGRESS NOTES
Biopsies taken during recent EGD positive for H pylori.  Will prescribe a course of antibiotics as well as antacids, all of which to be taken twice a day for full to weeks.  Following completion of regimen a stool test will need to be performed to confirm eradication.    Colon polyps benign

## 2022-11-03 ENCOUNTER — OFFICE VISIT (OUTPATIENT)
Dept: GASTROENTEROLOGY | Facility: CLINIC | Age: 52
End: 2022-11-03
Payer: COMMERCIAL

## 2022-11-03 VITALS — BODY MASS INDEX: 22.56 KG/M2 | HEIGHT: 61 IN | WEIGHT: 119.5 LBS

## 2022-11-03 DIAGNOSIS — K29.70 GASTRITIS, HELICOBACTER PYLORI: Primary | ICD-10-CM

## 2022-11-03 DIAGNOSIS — B96.81 GASTRITIS, HELICOBACTER PYLORI: Primary | ICD-10-CM

## 2022-11-03 PROCEDURE — 1159F PR MEDICATION LIST DOCUMENTED IN MEDICAL RECORD: ICD-10-PCS | Mod: CPTII,S$GLB,, | Performed by: NURSE PRACTITIONER

## 2022-11-03 PROCEDURE — 99214 OFFICE O/P EST MOD 30 MIN: CPT | Mod: S$GLB,,, | Performed by: NURSE PRACTITIONER

## 2022-11-03 PROCEDURE — 1159F MED LIST DOCD IN RCRD: CPT | Mod: CPTII,S$GLB,, | Performed by: NURSE PRACTITIONER

## 2022-11-03 PROCEDURE — 3008F PR BODY MASS INDEX (BMI) DOCUMENTED: ICD-10-PCS | Mod: CPTII,S$GLB,, | Performed by: NURSE PRACTITIONER

## 2022-11-03 PROCEDURE — 99999 PR PBB SHADOW E&M-EST. PATIENT-LVL III: ICD-10-PCS | Mod: PBBFAC,,, | Performed by: NURSE PRACTITIONER

## 2022-11-03 PROCEDURE — 99999 PR PBB SHADOW E&M-EST. PATIENT-LVL III: CPT | Mod: PBBFAC,,, | Performed by: NURSE PRACTITIONER

## 2022-11-03 PROCEDURE — 3008F BODY MASS INDEX DOCD: CPT | Mod: CPTII,S$GLB,, | Performed by: NURSE PRACTITIONER

## 2022-11-03 PROCEDURE — 99214 PR OFFICE/OUTPT VISIT, EST, LEVL IV, 30-39 MIN: ICD-10-PCS | Mod: S$GLB,,, | Performed by: NURSE PRACTITIONER

## 2022-11-03 NOTE — PROGRESS NOTES
"     GASTROENTEROLOGY CLINIC NOTE    Chief Complaint: The encounter diagnosis was Gastritis, Helicobacter pylori.  Referring provider/PCP: Primary Doctor No    HPI:  Carrington Mckinley is a 52 y.o. female who is a new patient to me, she is here today to establish care for history of H. Pylori. She was previously seen by Dr. Pinedo in July with GERD, fullness, and "upset stomach"- ordered EGD/Colon. EGD showed gastritis, grade A esophagitis, and a hiatal hernia. Hpylori bx (+). Colonoscopy showed polyps and hemmorhoids, recommended to repeat in three years. Pt was tx for H.Pylori with triple therapy and reports completing all medications. She states symptoms have improved significantly but notes infrequent epigastric burning. She no longer takes protonix.       NSAIDs: No  Anticoagulation or Antiplatelet: No      History of H.pylori: yes  H.pylori Treatment: triple therapy  Prior Upper Endoscopy: 2022  Impression:            - LA Grade A reflux esophagitis with no bleeding.                          - Small hiatal hernia.                          - Erythematous mucosa in the gastric body.                          Biopsied.                          - Normal examined duodenum.   Recommendation:        - Discharge patient to home.                          - Resume previous diet.                          - Continue present medications.                          - Await pathology results.                          - Perform a colonoscopy as previously scheduled.   Pathology:  1. Stomach, biopsy:   - Antral and oxyntic mucosa with  H. pylori  associated gastritis.     Prior Colonoscopy: 2022, 3yr repeat  Impression:            - The examined portion of the ileum was normal.                          - One 2 mm polyp in the cecum, removed with a cold                          biopsy forceps. Resected and retrieved.                          - One 5 mm polyp in the transverse colon, removed                          with a cold snare. " Resected and retrieved.                          - One 10 mm polyp in the sigmoid colon, removed                          with a cold snare. Resected and retrieved.                          - Internal hemorrhoids.   Recommendation:        - Discharge patient to home.                          - Resume previous diet.                          - Continue present medications.                          - Await pathology results.                          - Repeat colonoscopy in 3 years for surveillance.    Pathology:  2. Colon, cecum, polyp, biopsy:   - Colonic mucosa lymphoid aggregate.   3. Colon, transverse, polyp, biopsy:   - Sessile serrated lesion, negative for cytologic dysplasia.   4. Colon, sigmoid, polyp, biopsy:   - Tubular adenoma.     Family h/o Colon Cancer: No  Family h/o Crohn's Disease or Ulcerative Colitis: No  Family h/o Celiac Sprue: No      Review of Systems   Gastrointestinal: Negative.      Past Medical History: has no past medical history on file.    Past Surgical History: has a past surgical history that includes  section; Breast cyst excision (Left); Esophagogastroduodenoscopy (N/A, 2022); and Colonoscopy (N/A, 2022).    Family History:family history includes Breast cancer in her sister.    Allergies:   Review of patient's allergies indicates:   Allergen Reactions    Delsym [dextromethorphan hbr] Hallucinations       Social History: reports that she has never smoked. She has never used smokeless tobacco. She reports that she does not drink alcohol and does not use drugs.    Home medications:   Current Outpatient Medications on File Prior to Visit   Medication Sig Dispense Refill    atorvastatin (LIPITOR) 10 MG tablet Take 10 mg by mouth nightly.      sucralfate (CARAFATE) 1 gram tablet Take 1 g by mouth 2 (two) times daily.      iron bis-gly/FA/C/B12/Ca/succ (IRON-150 ORAL) Take by mouth.      pantoprazole (PROTONIX) 40 MG tablet Take 40 mg by mouth once daily.       No current  "facility-administered medications on file prior to visit.       Vital signs:  Ht 5' 1" (1.549 m)   Wt 54.2 kg (119 lb 7.8 oz)   BMI 22.58 kg/m²     Physical Exam  Abdominal:      General: Abdomen is flat. Bowel sounds are normal.      Palpations: Abdomen is soft.       Routine labs:  Lab Results   Component Value Date    WBC 9.70 02/07/2012    HGB 10.3 (L) 02/07/2012    HCT 30.1 (L) 02/07/2012    MCV 88.1 02/07/2012     02/07/2012     No results found for: INR  No results found for: IRON, FERRITIN, TIBC, FESATURATED  No results found for: NA, K, CL, CO2, BUN, CREATININE, GLUF  No results found for: ALBUMIN, ALT, AST, GGT, ALKPHOS, BILITOT  Lab Results   Component Value Date    GLUCOSE 93 11/21/2011     Lab Results   Component Value Date    TSH 0.075 (L) 08/22/2011     No results found for: CALCIUM, PHOS    Imaging:  CT Abdomen Without Contrast  Narrative: EXAMINATION:  CT ABDOMEN WITHOUT CONTRAST    CLINICAL HISTORY:  R10.9;  Unspecified abdominal pain    TECHNIQUE:  Routine multiplanar CT abdomen protocol performed the diaphragm to the iliac crest    COMPARISON:  None    FINDINGS:  The liver, gallbladder, pancreas, spleen, and adrenal glands are grossly unremarkable, noting limited assessment without IV contrast.  No renal stones or hydronephrosis.  The abdominal aorta tapers normally.  There is minimal scattered calcific atherosclerosis.  No periaortic lymphadenopathy.    There is prominent stool at the terminal ileum suggesting incompetent ileocecal valve.  Scattered colonic diverticulum.  No pericolonic fat stranding/inflammatory change.  No dilated loops of bowel.  No mesenteric lymphadenopathy.    Ventral wall: Unremarkable.    Bones: No marrow replacement process.    Lung bases: Clear.  Impression: No acute findings.    Electronically signed by: Cecilio Mccurdy MD  Date:    06/21/2022  Time:    09:25      I have reviewed prior labs, imaging, and notes.      Assessment:  1. Gastritis, Helicobacter pylori "        Plan:  Orders Placed This Encounter    H. pylori antigen, stool     Stool sample    Plan of care discussed with patient who is in agreement and verbalized understanding.     I have explained the planned procedures to the patient.The risks, benefits and alternatives of the procedure were also explained in detail. Patient verbalized understanding, all questions were answered. The patient agrees to proceed as planned    Follow Up: as needed          ANA MARIA Kelsey,FNP-BC  [facility] [place]    (Portions of this note were dictated using voice recognition software and may contain dictation related errors in spelling/grammar/syntax not found on text review)

## 2022-11-29 ENCOUNTER — LAB VISIT (OUTPATIENT)
Dept: LAB | Facility: HOSPITAL | Age: 52
End: 2022-11-29
Attending: NURSE PRACTITIONER
Payer: COMMERCIAL

## 2022-11-29 DIAGNOSIS — K29.70 GASTRITIS, HELICOBACTER PYLORI: ICD-10-CM

## 2022-11-29 DIAGNOSIS — B96.81 GASTRITIS, HELICOBACTER PYLORI: ICD-10-CM

## 2022-11-29 PROCEDURE — 87338 HPYLORI STOOL AG IA: CPT | Performed by: NURSE PRACTITIONER

## 2022-12-06 ENCOUNTER — TELEPHONE (OUTPATIENT)
Dept: GASTROENTEROLOGY | Facility: CLINIC | Age: 52
End: 2022-12-06
Payer: COMMERCIAL

## 2022-12-06 DIAGNOSIS — B96.81 GASTRITIS, HELICOBACTER PYLORI: Primary | ICD-10-CM

## 2022-12-06 DIAGNOSIS — K29.70 GASTRITIS, HELICOBACTER PYLORI: Primary | ICD-10-CM

## 2022-12-06 LAB
H PYLORI AG STL QL IA: DETECTED
SPECIMEN SOURCE: 1

## 2022-12-06 RX ORDER — BISMUTH SUBCITRATE POTASSIUM, METRONIDAZOLE, TETRACYCLINE HYDROCHLORIDE 140; 125; 125 MG/1; MG/1; MG/1
3 CAPSULE ORAL 4 TIMES DAILY
Qty: 120 CAPSULE | Refills: 0 | Status: SHIPPED | OUTPATIENT
Start: 2022-12-06 | End: 2022-12-16

## 2022-12-07 ENCOUNTER — PATIENT MESSAGE (OUTPATIENT)
Dept: GASTROENTEROLOGY | Facility: CLINIC | Age: 52
End: 2022-12-07
Payer: COMMERCIAL

## 2022-12-07 ENCOUNTER — TELEPHONE (OUTPATIENT)
Dept: GASTROENTEROLOGY | Facility: CLINIC | Age: 52
End: 2022-12-07
Payer: COMMERCIAL

## 2022-12-07 NOTE — TELEPHONE ENCOUNTER
H.pylori remains positive. Failed triple therapy.    Quadruple therapy Pylera and Protonix sent to pharmacy.

## 2022-12-07 NOTE — TELEPHONE ENCOUNTER
Spoke to pt and informed H.pylori stool test remains positive. We need to treat again with a different treatment regimen.  Jaylin SMITH sent in Pylera to her pharmacy. You will take it for 10 days. While you are is taking the Pylera, you will increase the pantoprazole to twice a day. We will have to recheck a stool sample one month after completion to confirm eradication. In[facility] and ask for stool kit and once you collected your stool, bring it back to the lab. Also got scheduled for a follow up appointment with Jaylin SMITH 2/7 for 9:30am. Message was sent via portal per pt request. Verbalized understanding.

## 2022-12-08 DIAGNOSIS — B96.81 GASTRITIS, HELICOBACTER PYLORI: ICD-10-CM

## 2022-12-08 DIAGNOSIS — K29.70 GASTRITIS, HELICOBACTER PYLORI: ICD-10-CM

## 2022-12-08 RX ORDER — PANTOPRAZOLE SODIUM 40 MG/1
40 TABLET, DELAYED RELEASE ORAL DAILY
Qty: 30 TABLET | Refills: 11 | Status: SHIPPED | OUTPATIENT
Start: 2022-12-08 | End: 2023-02-07 | Stop reason: ALTCHOICE

## 2022-12-08 RX ORDER — BISMUTH SUBCITRATE POTASSIUM, METRONIDAZOLE, TETRACYCLINE HYDROCHLORIDE 140; 125; 125 MG/1; MG/1; MG/1
3 CAPSULE ORAL 4 TIMES DAILY
Qty: 120 CAPSULE | Refills: 0 | Status: CANCELLED | OUTPATIENT
Start: 2022-12-08 | End: 2022-12-18

## 2023-02-01 DIAGNOSIS — B96.81 GASTRITIS, HELICOBACTER PYLORI: Primary | ICD-10-CM

## 2023-02-01 DIAGNOSIS — K29.70 GASTRITIS, HELICOBACTER PYLORI: Primary | ICD-10-CM

## 2023-02-02 ENCOUNTER — LAB VISIT (OUTPATIENT)
Dept: LAB | Facility: HOSPITAL | Age: 53
End: 2023-02-02
Attending: NURSE PRACTITIONER
Payer: COMMERCIAL

## 2023-02-02 DIAGNOSIS — K29.70 GASTRITIS, HELICOBACTER PYLORI: ICD-10-CM

## 2023-02-02 DIAGNOSIS — B96.81 GASTRITIS, HELICOBACTER PYLORI: ICD-10-CM

## 2023-02-02 PROCEDURE — 87338 HPYLORI STOOL AG IA: CPT | Performed by: NURSE PRACTITIONER

## 2023-02-06 NOTE — PROGRESS NOTES
"     GASTROENTEROLOGY CLINIC NOTE    Chief Complaint: The encounter diagnosis was Gastritis, Helicobacter pylori.  Referring provider/PCP: Primary Doctor No    HPI:  Carrington Mckinley is a 52 y.o. female who is a new patient to me, she is here today to establish care for history of H. Pylori. She was previously seen by Dr. Pinedo in July with GERD, fullness, and "upset stomach"- ordered EGD/Colon. EGD showed gastritis, grade A esophagitis, and a hiatal hernia. Hpylori bx (+). Colonoscopy showed polyps and hemmorhoids, recommended to repeat in three years. Pt was tx for H.Pylori with triple therapy and reports completing all medications. She states symptoms have improved significantly but notes infrequent epigastric burning. She no longer takes protonix.     Interval Note 2/7/2023  Ms. Carrington Mckinley who is known to me presents to clinic for follow up for H.pylori.  Following last appointment, stool was checked for eradication but remained positive.  She was then initiated on Pylera.  She has completed treatment and reports she is doing well.  Denies reflux, pyrosis, or epigastric pain.  Stool sample was turned into lab and is pending.     NSAIDs: No  Anticoagulation or Antiplatelet: No      History of H.pylori: yes  H.pylori Treatment: triple therapy (failed); 12/6/2022-12/16/2022 Pylera    Prior Upper Endoscopy: 2022  Impression:     - LA Grade A reflux esophagitis with no bleeding.                          - Small hiatal hernia.                          - Erythematous mucosa in the gastric body.                          Biopsied.                          - Normal examined duodenum.   Recommendation:        - Discharge patient to home.                          - Resume previous diet.                          - Continue present medications.                          - Await pathology results.                          - Perform a colonoscopy as previously scheduled.   Pathology:  1. Stomach, biopsy:   - Antral and oxyntic " mucosa with  H. pylori  associated gastritis.     Prior Colonoscopy: 2022, 3yr repeat  Impression:            - The examined portion of the ileum was normal.                          - One 2 mm polyp in the cecum, removed with a cold                          biopsy forceps. Resected and retrieved.                          - One 5 mm polyp in the transverse colon, removed                          with a cold snare. Resected and retrieved.                          - One 10 mm polyp in the sigmoid colon, removed                          with a cold snare. Resected and retrieved.                          - Internal hemorrhoids.   Recommendation:        - Discharge patient to home.                          - Resume previous diet.                          - Continue present medications.                          - Await pathology results.                          - Repeat colonoscopy in 3 years for surveillance.    Pathology:  2. Colon, cecum, polyp, biopsy:   - Colonic mucosa lymphoid aggregate.   3. Colon, transverse, polyp, biopsy:   - Sessile serrated lesion, negative for cytologic dysplasia.   4. Colon, sigmoid, polyp, biopsy:   - Tubular adenoma.     Family h/o Colon Cancer: No  Family h/o Crohn's Disease or Ulcerative Colitis: No  Family h/o Celiac Sprue: No      Review of Systems   Constitutional:  Negative for weight loss.   HENT:  Negative for sore throat.    Eyes:  Negative for blurred vision.   Respiratory:  Negative for cough.    Cardiovascular:  Negative for chest pain.   Gastrointestinal:  Negative for abdominal pain, blood in stool, constipation, diarrhea, heartburn, melena, nausea and vomiting.   Genitourinary:  Negative for dysuria.   Musculoskeletal:  Negative for myalgias.   Skin:  Negative for rash.   Neurological:  Negative for headaches.   Endo/Heme/Allergies:  Negative for environmental allergies.   Psychiatric/Behavioral:  Negative for suicidal ideas. The patient is not nervous/anxious.      Past  "Medical History: has no past medical history on file.    Past Surgical History: has a past surgical history that includes  section; Breast cyst excision (Left); Esophagogastroduodenoscopy (N/A, 2022); and Colonoscopy (N/A, 2022).    Family History:family history includes Breast cancer in her sister.    Allergies:   Review of patient's allergies indicates:   Allergen Reactions    Delsym [dextromethorphan hbr] Hallucinations       Social History: reports that she has never smoked. She has never used smokeless tobacco. She reports that she does not drink alcohol and does not use drugs.    Home medications:   Current Outpatient Medications on File Prior to Visit   Medication Sig Dispense Refill    atorvastatin (LIPITOR) 10 MG tablet Take 10 mg by mouth nightly.      iron bis-gly/FA/C/B12/Ca/succ (IRON-150 ORAL) Take by mouth.      pantoprazole (PROTONIX) 40 MG tablet Take 1 tablet (40 mg total) by mouth once daily. (Patient not taking: Reported on 2023) 30 tablet 11    sucralfate (CARAFATE) 1 gram tablet Take 1 g by mouth 2 (two) times daily.       No current facility-administered medications on file prior to visit.       Vital signs:  Ht 5' 1" (1.549 m)   Wt 54.4 kg (119 lb 14.9 oz)   BMI 22.66 kg/m²     Physical Exam  Vitals reviewed.   Constitutional:       General: She is not in acute distress.     Appearance: Normal appearance. She is not ill-appearing.   HENT:      Head: Normocephalic.   Cardiovascular:      Rate and Rhythm: Normal rate and regular rhythm.      Heart sounds: Normal heart sounds. No murmur heard.  Pulmonary:      Effort: Pulmonary effort is normal. No respiratory distress.      Breath sounds: Normal breath sounds.   Chest:      Chest wall: No tenderness.   Abdominal:      General: Abdomen is flat. Bowel sounds are normal. There is no distension.      Palpations: Abdomen is soft.      Tenderness: There is no abdominal tenderness. Negative signs include Krishna's sign.      " Hernia: No hernia is present.   Skin:     General: Skin is warm.   Neurological:      Mental Status: She is alert and oriented to person, place, and time.   Psychiatric:         Mood and Affect: Mood normal.         Behavior: Behavior normal.       Routine labs:  Lab Results   Component Value Date    WBC 9.70 02/07/2012    HGB 10.3 (L) 02/07/2012    HCT 30.1 (L) 02/07/2012    MCV 88.1 02/07/2012     02/07/2012     No results found for: INR  No results found for: IRON, FERRITIN, TIBC, FESATURATED  No results found for: NA, K, CL, CO2, BUN, CREATININE, GLUF  No results found for: ALBUMIN, ALT, AST, GGT, ALKPHOS, BILITOT  Lab Results   Component Value Date    GLUCOSE 93 11/21/2011     Lab Results   Component Value Date    TSH 0.075 (L) 08/22/2011     No results found for: CALCIUM, PHOS    Imaging:  CT Abdomen Without Contrast  Narrative: EXAMINATION:  CT ABDOMEN WITHOUT CONTRAST    CLINICAL HISTORY:  R10.9;  Unspecified abdominal pain    TECHNIQUE:  Routine multiplanar CT abdomen protocol performed the diaphragm to the iliac crest    COMPARISON:  None    FINDINGS:  The liver, gallbladder, pancreas, spleen, and adrenal glands are grossly unremarkable, noting limited assessment without IV contrast.  No renal stones or hydronephrosis.  The abdominal aorta tapers normally.  There is minimal scattered calcific atherosclerosis.  No periaortic lymphadenopathy.    There is prominent stool at the terminal ileum suggesting incompetent ileocecal valve.  Scattered colonic diverticulum.  No pericolonic fat stranding/inflammatory change.  No dilated loops of bowel.  No mesenteric lymphadenopathy.    Ventral wall: Unremarkable.    Bones: No marrow replacement process.    Lung bases: Clear.  Impression: No acute findings.    Electronically signed by: Cecilio Mccurdy MD  Date:    06/21/2022  Time:    09:25        I have reviewed prior labs, imaging, and notes.      Assessment:  1. Gastritis, Helicobacter pylori          Plan:      H.pylori stool antigen pending    If positive, consider treating with Talicia or repeating EGD with culture.     Plan of care discussed with patient who is in agreement and verbalized understanding.     I have explained the planned procedures to the patient.The risks, benefits and alternatives of the procedure were also explained in detail. Patient verbalized understanding, all questions were answered. The patient agrees to proceed as planned    Follow Up: as needed          ANA MARIA Kelsey,FNP-BC  [facility] [place]    (Portions of this note were dictated using voice recognition software and may contain dictation related errors in spelling/grammar/syntax not found on text review)

## 2023-02-07 ENCOUNTER — OFFICE VISIT (OUTPATIENT)
Dept: GASTROENTEROLOGY | Facility: CLINIC | Age: 53
End: 2023-02-07
Payer: COMMERCIAL

## 2023-02-07 VITALS — BODY MASS INDEX: 22.64 KG/M2 | HEIGHT: 61 IN | WEIGHT: 119.94 LBS

## 2023-02-07 DIAGNOSIS — B96.81 GASTRITIS, HELICOBACTER PYLORI: Primary | ICD-10-CM

## 2023-02-07 DIAGNOSIS — K29.70 GASTRITIS, HELICOBACTER PYLORI: Primary | ICD-10-CM

## 2023-02-07 PROCEDURE — 99999 PR PBB SHADOW E&M-EST. PATIENT-LVL III: ICD-10-PCS | Mod: PBBFAC,,, | Performed by: NURSE PRACTITIONER

## 2023-02-07 PROCEDURE — 3008F PR BODY MASS INDEX (BMI) DOCUMENTED: ICD-10-PCS | Mod: CPTII,S$GLB,, | Performed by: NURSE PRACTITIONER

## 2023-02-07 PROCEDURE — 99213 OFFICE O/P EST LOW 20 MIN: CPT | Mod: S$GLB,,, | Performed by: NURSE PRACTITIONER

## 2023-02-07 PROCEDURE — 3008F BODY MASS INDEX DOCD: CPT | Mod: CPTII,S$GLB,, | Performed by: NURSE PRACTITIONER

## 2023-02-07 PROCEDURE — 1159F PR MEDICATION LIST DOCUMENTED IN MEDICAL RECORD: ICD-10-PCS | Mod: CPTII,S$GLB,, | Performed by: NURSE PRACTITIONER

## 2023-02-07 PROCEDURE — 1159F MED LIST DOCD IN RCRD: CPT | Mod: CPTII,S$GLB,, | Performed by: NURSE PRACTITIONER

## 2023-02-07 PROCEDURE — 99213 PR OFFICE/OUTPT VISIT, EST, LEVL III, 20-29 MIN: ICD-10-PCS | Mod: S$GLB,,, | Performed by: NURSE PRACTITIONER

## 2023-02-07 PROCEDURE — 99999 PR PBB SHADOW E&M-EST. PATIENT-LVL III: CPT | Mod: PBBFAC,,, | Performed by: NURSE PRACTITIONER

## 2023-02-07 NOTE — PATIENT INSTRUCTIONS
We will call you when I receive the test results.     Take reflux medication as needed.     Follow up as needed.

## 2023-02-09 LAB
H PYLORI AG STL QL IA: NOT DETECTED
SPECIMEN SOURCE: NORMAL

## 2023-02-10 ENCOUNTER — TELEPHONE (OUTPATIENT)
Dept: GASTROENTEROLOGY | Facility: CLINIC | Age: 53
End: 2023-02-10
Payer: COMMERCIAL

## 2023-02-10 NOTE — TELEPHONE ENCOUNTER
Spoke to pt and informed H.pylori is negative indicating it has been successfully treated. She can follow up as needed.  verbalized understanding

## 2023-02-10 NOTE — TELEPHONE ENCOUNTER
----- Message from Jaylin Anderson NP sent at 2/9/2023  5:20 PM CST -----  Please let patient know H.pylori is negative indicating it has been successfully treated. She can follow up as needed.

## 2023-04-20 DIAGNOSIS — Z12.31 SCREENING MAMMOGRAM, ENCOUNTER FOR: Primary | ICD-10-CM

## 2023-05-02 ENCOUNTER — HOSPITAL ENCOUNTER (OUTPATIENT)
Dept: RADIOLOGY | Facility: HOSPITAL | Age: 53
Discharge: HOME | End: 2023-05-02
Attending: FAMILY MEDICINE
Payer: COMMERCIAL

## 2023-05-02 DIAGNOSIS — Z12.31 SCREENING MAMMOGRAM, ENCOUNTER FOR: ICD-10-CM

## 2023-05-02 PROCEDURE — 77063 BREAST TOMOSYNTHESIS BI: CPT | Mod: 26,,, | Performed by: RADIOLOGY

## 2023-05-02 PROCEDURE — 77067 SCR MAMMO BI INCL CAD: CPT | Mod: TC

## 2023-05-02 PROCEDURE — 77067 SCR MAMMO BI INCL CAD: CPT | Mod: 26,,, | Performed by: RADIOLOGY

## 2023-05-02 PROCEDURE — 77067 MAMMO DIGITAL SCREENING BILAT WITH TOMO: ICD-10-PCS | Mod: 26,,, | Performed by: RADIOLOGY

## 2023-05-02 PROCEDURE — 77063 MAMMO DIGITAL SCREENING BILAT WITH TOMO: ICD-10-PCS | Mod: 26,,, | Performed by: RADIOLOGY

## 2024-03-22 DIAGNOSIS — Z12.31 SCREENING MAMMOGRAM, ENCOUNTER FOR: Primary | ICD-10-CM

## 2024-05-07 ENCOUNTER — HOSPITAL ENCOUNTER (OUTPATIENT)
Dept: RADIOLOGY | Facility: HOSPITAL | Age: 54
Discharge: HOME | End: 2024-05-07
Attending: FAMILY MEDICINE
Payer: COMMERCIAL

## 2024-05-07 DIAGNOSIS — Z12.31 SCREENING MAMMOGRAM, ENCOUNTER FOR: ICD-10-CM

## 2024-05-07 PROCEDURE — 77063 BREAST TOMOSYNTHESIS BI: CPT | Mod: 26,,, | Performed by: RADIOLOGY

## 2024-05-07 PROCEDURE — 77067 SCR MAMMO BI INCL CAD: CPT | Mod: 26,,, | Performed by: RADIOLOGY

## 2024-05-07 PROCEDURE — 77063 BREAST TOMOSYNTHESIS BI: CPT | Mod: TC

## 2024-10-16 ENCOUNTER — TELEPHONE (OUTPATIENT)
Dept: PHARMACY | Facility: CLINIC | Age: 54
End: 2024-10-16
Payer: COMMERCIAL

## 2024-10-16 NOTE — TELEPHONE ENCOUNTER
[facility] Chart Review & Patient Outreach Details For Medication Adherence Project    Reason for Outreach Encounter: 3rd Party payor non-compliance report ([employer], [employer], C, etc)  2.  Patient Outreach Method: Reviewed patient chart   3.   Medication in question:   Hyperlipidemia Medications               atorvastatin (LIPITOR) 10 MG tablet Take 10 mg by mouth nightly.                  Lipitor  last filled  8/4/24 for 90 day supply      4.  Reviewed and or Updates Made To: Patient Chart  5. Outreach Outcomes and/or actions taken: Patient filled medication and is on track to be adherent  Additional Notes:

## 2024-11-11 ENCOUNTER — TELEPHONE (OUTPATIENT)
Dept: PHARMACY | Facility: CLINIC | Age: 54
End: 2024-11-11
Payer: COMMERCIAL

## 2024-11-12 NOTE — TELEPHONE ENCOUNTER
[facility] Chart Review & Patient Outreach Details For Medication Adherence Project    Reason for Outreach Encounter: 3rd Party payor non-compliance report ([employer], [employer], C, etc)  2.  Patient Outreach Method: Reviewed Patient Chart  3.   Medication in question: atorvastatin    LAST FILLED: 10/30/24 for 90 day supply  Hyperlipidemia Medications               atorvastatin (LIPITOR) 10 MG tablet Take 10 mg by mouth nightly.               4.  Reviewed and or Updates Made To: Patient Chart  5. Outreach Outcomes and/or actions taken: Patient filled medication and is on track to be adherent